# Patient Record
Sex: FEMALE | Race: WHITE
[De-identification: names, ages, dates, MRNs, and addresses within clinical notes are randomized per-mention and may not be internally consistent; named-entity substitution may affect disease eponyms.]

---

## 2017-03-17 ENCOUNTER — HOSPITAL ENCOUNTER (OUTPATIENT)
Dept: HOSPITAL 45 - C.MAMM | Age: 67
Discharge: HOME | End: 2017-03-17
Attending: NURSE PRACTITIONER
Payer: COMMERCIAL

## 2017-03-17 DIAGNOSIS — Z12.31: Primary | ICD-10-CM

## 2017-03-17 NOTE — MAMMOGRAPHY REPORT
BILATERAL DIGITAL SCREENING MAMMOGRAM WITH CAD: 3/17/2017

CLINICAL HISTORY: Routine screening.  Patient has no complaints.  





TECHNIQUE:  Current study was also evaluated with a Computer Aided Detection (CAD) system.  Bilatera
l CC and MLO and XCCL views were obtained.



COMPARISON: Comparison is made to exams dated:  3/10/2016 mammogram, 3/3/2014 mammogram, 3/6/2014 ma
mmogram, 2/28/2013 mammogram, and 2/28/2012 mammogram - [facility].   



BREAST COMPOSITION:  There are scattered areas of fibroglandular density in both breasts.  



FINDINGS:  No suspicious masses, calcifications, or areas of architectural distortion are noted in e
ither breast. There has been no significant interval change compared to prior exams.  





IMPRESSION:  ACR BI-RADS CATEGORY 1: NEGATIVE

There is no mammographic evidence of malignancy. A 1 year screening mammogram is recommended.  The p
atient will receive written notification of the results.  





Approximately 10% of breast cancers are not detected with mammography. A negative mammographic repor
t should not delay biopsy if a clinically suggestive mass is present.



Claudia Leahy M.D.          

/:3/17/2017 15:46:21  



Imaging Technologist: Jose THOMAS(ASHOK)(JENNY), [facility]

letter sent: Normal 1/2  

BI-RADS Code: ACR BI-RADS Category 1: Negative

## 2018-02-26 ENCOUNTER — HOSPITAL ENCOUNTER (OUTPATIENT)
Dept: HOSPITAL 45 - C.RAD | Age: 68
Discharge: HOME | End: 2018-02-26
Attending: NURSE PRACTITIONER
Payer: COMMERCIAL

## 2018-02-26 DIAGNOSIS — M54.9: Primary | ICD-10-CM

## 2018-02-26 DIAGNOSIS — M53.3: ICD-10-CM

## 2018-02-26 NOTE — DIAGNOSTIC IMAGING REPORT
L-SPINE MIN 4 VIEWS ROUTINE



CLINICAL HISTORY: Lower back pain.    



COMPARISON: None



FINDINGS:  Alignment of the lumbar spine is anatomic. No fracture or suspicious

lesion. There is mild multilevel endplate osteophytosis. There is minimal facet

arthrosis.



IMPRESSION: 



1. No lumbar spine fracture or subluxation.



2. Mild multilevel degenerative disc disease and facet arthrosis.







Electronically signed by:  Darek Andino M.D.

2/26/2018 10:21 AM



Dictated Date/Time:  2/26/2018 10:20 AM

## 2018-02-26 NOTE — DIAGNOSTIC IMAGING REPORT
SI JOINTS 3 OR MORE VIEWS



CLINICAL HISTORY: Lower back pain.    



COMPARISON STUDY:  CT of the abdomen and pelvis May 22, 2013.



FINDINGS: The sacroiliac joints are intact without evidence for ankylosis. There

is minimal osteophytosis. No erosions are identified.



IMPRESSION:  Mild osteoarthritis of the bilateral sacroiliac joints.









Electronically signed by:  Darek Andino M.D.

2/26/2018 10:24 AM



Dictated Date/Time:  2/26/2018 10:23 AM

## 2018-03-20 ENCOUNTER — HOSPITAL ENCOUNTER (OUTPATIENT)
Dept: HOSPITAL 45 - C.MAMM | Age: 68
Discharge: HOME | End: 2018-03-20
Attending: NURSE PRACTITIONER
Payer: COMMERCIAL

## 2018-03-20 DIAGNOSIS — Z12.31: Primary | ICD-10-CM

## 2018-03-20 NOTE — MAMMOGRAPHY REPORT
BILATERAL DIGITAL SCREENING MAMMOGRAM TOMOSYNTHESIS WITH CAD: 3/20/2018

CLINICAL HISTORY: Routine screening.  Patient has no complaints.  





TECHNIQUE:  Breast tomosynthesis in addition to standard 2D mammography was performed. Current study 
was also evaluated with a Computer Aided Detection (CAD) system.  



COMPARISON: Comparison is made to exams dated:  3/17/2017 mammogram, 3/10/2016 mammogram, 3/5/2015 ma
mmogram, 3/6/2014 mammogram, 3/3/2014 mammogram, and 2/28/2013 mammogram - [facility]
er.   



BREAST COMPOSITION:  There are scattered areas of fibroglandular density in both breasts.  



FINDINGS:  No suspicious masses, calcifications, or areas of architectural distortion are noted in ei
ther breast. There has been no significant interval change compared to prior exams.  Scattered bilate
ral benign-appearing calcifications are again noted.





IMPRESSION:  ACR BI-RADS CATEGORY 2: BENIGN

There is no mammographic evidence of malignancy. A 1 year screening mammogram is recommended.  The pa
tient will receive written notification of the results.  





Approximately 10% of breast cancers are not detected with mammography. A negative mammographic report
 should not delay biopsy if a clinically suggestive mass is present.



Claudia Leahy M.D.          

/:3/20/2018 10:09:02  



Imaging Technologist: Vielka WALTON)(JENNY), [facility]

letter sent: Normal 1/2  

BI-RADS Code: ACR BI-RADS Category 2: Benign